# Patient Record
(demographics unavailable — no encounter records)

---

## 2024-11-19 NOTE — REVIEW OF SYSTEMS
[Abdominal Pain] : abdominal pain [Joint Pain] : joint pain [Negative] : Allergic/Immunologic [Vomiting] : no vomiting [Joint Stiffness] : no joint stiffness [Skin Rash] : no skin rash [Skin Wound] : no skin wound [FreeTextEntry9] : right knee discomfort less [de-identified] : oval skin lesion slight raised rt side face by ear and left side face - left sided lesion larger but lighter, smaller right sided lsion above first, no tumors, skin intact, no pruritus [de-identified] : right sided sciatica, persists

## 2024-11-19 NOTE — HISTORY OF PRESENT ILLNESS
[Disease:__________________________] : Disease: [unfilled] [de-identified] : Ms. 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 She first noted first noted a slightly raised lesion on her right cheek  several yrs ago which grew slowly until she was seen by a dermatologist, Dr. Kan Birmingham, [place], [place].  Biopsy was read at  [facility] (7/25/2022, reported 8/26/22) as a "nodular lymphoid infiltrate with unusual features."  A dense nodular lymphoid  infiltrate was seen in the reticular dermis where it formed germinal centers with a mantle zone; there was increased kappa vs lambda  light chain staining by about 10:1 and Ig gene rearrangement was (+). CT of neck showed sinusitis; CT C/A/P showed no LAD or H/S megaly; there were small pulm nodules, mildly enlarged uterus ? due to adenomyosis and a 4 cm left adnexal cyst. Pt reports benign findings from a pelvic U/S. She saw Dr. Toyin Shook of [facility] and pt did not bring her recommendations with her.  She has been prescribed aclometasone 0.05% and she has been applying this to the cheek skin lesion 2x/d for the last two days.  She has no constitutional c/o. She has had intermittent midepigastric pain and had upper endoscopy on 1/6/23 which only showed mild antral erythema. Biopsies were taken, results not available at this time.  She has no pruritus.   [de-identified] : ?IE [de-identified] : extranodal MZL, cutaneous [de-identified] : Extra inna marginal zone lymphoma- followed by derm Dr Kan Louis tel [phone]; she has had injections into  right cheek , now has another lesion anterior to first.  She also has a mild elevated lesion with pigmentation on left malar area. Also has been seeing Dr. Vandana Austin.  no new skin received RT to right malar lesion (Dr. Snowden, [facility] [place], [place]) No pruritus or skin breakdown. No constitutional c/o. no new skin lesions MRI of pelvis Uterus- 10.6 x 6.3 x 6.7 cm, EMS- 7 mm, there is a simple cyst within the left adnexal region intimately abutting the left ovary. The cyst measures 2.5 x 3 x 4.3 cm. Had bilat salpingectomy with benign findings.

## 2024-11-19 NOTE — PHYSICAL EXAM
[Fully active, able to carry on all pre-disease performance without restriction] : Status 0 - Fully active, able to carry on all pre-disease performance without restriction [Normal] : affect appropriate [de-identified] : no CVAT [de-identified] : minimally elevated, smooth, mildly hyperpigment oval macule, about > 1 cm  right malar prominence Other variably shaped and sized mildly hyperpigmented macules are present throughout torso. Smaller lesions above first on right, and on malar prominence on left

## 2024-11-19 NOTE — CONSULT LETTER
[DrNirmal  ___] : Dr. INGRAM [FreeTextEntry3] : Slick Calle M.D., [facility] [DrNirmal ___] : Dr. INGRAM

## 2024-11-19 NOTE — PHYSICAL EXAM
[Fully active, able to carry on all pre-disease performance without restriction] : Status 0 - Fully active, able to carry on all pre-disease performance without restriction [Normal] : affect appropriate [de-identified] : no CVAT [de-identified] : minimally elevated, smooth, mildly hyperpigment oval macule, about > 1 cm  right malar prominence Other variably shaped and sized mildly hyperpigmented macules are present throughout torso. Smaller lesions above first on right, and on malar prominence on left

## 2024-11-19 NOTE — ASSESSMENT
[Palliative] : Goals of care discussed with patient: Palliative [FreeTextEntry1] : 48 year old woman with mostly cheek lesions who has had several skin bxs done with consensus that she has findings c/w MZL. No evidence of systemic dz. Skin MZL is a very indolent neoplasm; in the 2022 ICC, it was recommended that lesions such as this be called lymphoproliferative diseases rather than lymphoma.   Pt has been seen by Dr. Kan Louis and Dr. Vandana Austin  rec continued f/u by derm  She is (+) for Hep B core antibody, has had nl LFTs, AFP and Hep B DNA PCR, and a normal fibroscan 1/31/23 (Dr. Coni Spann). she is Hep Bs Ag (-) and Ab(+) so at lower risk for reactivation  check CBC, CMP, LDH, Beta-2 MG   as long as continues regular derm f/u, RV 1 yr or prn

## 2024-11-19 NOTE — REVIEW OF SYSTEMS
[Abdominal Pain] : abdominal pain [Joint Pain] : joint pain [Negative] : Allergic/Immunologic [Vomiting] : no vomiting [Joint Stiffness] : no joint stiffness [Skin Rash] : no skin rash [Skin Wound] : no skin wound [FreeTextEntry9] : right knee discomfort less [de-identified] : oval skin lesion slight raised rt side face by ear and left side face - left sided lesion larger but lighter, smaller right sided lsion above first, no tumors, skin intact, no pruritus [de-identified] : right sided sciatica, persists

## 2024-11-19 NOTE — REASON FOR VISIT
[Follow-Up Visit] : a follow-up visit for [Lymphoma] : lymphoma [Family Member] : family member [FreeTextEntry2] : cutaneous marginal zone lymphoma

## 2024-11-19 NOTE — CONSULT LETTER
[DrNirmal  ___] : Dr. INGRAM [FreeTextEntry3] : Slick Calle M.D., [facility] [DrNirmal ___] : Dr. INGRAM

## 2024-11-19 NOTE — HISTORY OF PRESENT ILLNESS
[Disease:__________________________] : Disease: [unfilled] [de-identified] : Ms. 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 She first noted first noted a slightly raised lesion on her right cheek  several yrs ago which grew slowly until she was seen by a dermatologist, Dr. Kan Birmingham, [place], [place].  Biopsy was read at  [facility] (7/25/2022, reported 8/26/22) as a "nodular lymphoid infiltrate with unusual features."  A dense nodular lymphoid  infiltrate was seen in the reticular dermis where it formed germinal centers with a mantle zone; there was increased kappa vs lambda  light chain staining by about 10:1 and Ig gene rearrangement was (+). CT of neck showed sinusitis; CT C/A/P showed no LAD or H/S megaly; there were small pulm nodules, mildly enlarged uterus ? due to adenomyosis and a 4 cm left adnexal cyst. Pt reports benign findings from a pelvic U/S. She saw Dr. Toyin Shook of [facility] and pt did not bring her recommendations with her.  She has been prescribed aclometasone 0.05% and she has been applying this to the cheek skin lesion 2x/d for the last two days.  She has no constitutional c/o. She has had intermittent midepigastric pain and had upper endoscopy on 1/6/23 which only showed mild antral erythema. Biopsies were taken, results not available at this time.  She has no pruritus.   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  She also has a mild elevated lesion with pigmentation on left malar area. Also has been seeing Dr. Vandana Austin.  no new skin received RT to right malar lesion (Dr. Snowden, [facility], [place]) No pruritus or skin breakdown. No constitutional c/o. no new skin lesions MRI of pelvis Uterus- 10.6 x 6.3 x 6.7 cm, EMS- 7 mm, there is a simple cyst within the left adnexal region intimately abutting the left ovary. The cyst measures 2.5 x 3 x 4.3 cm. Had bilat salpingectomy with benign findings.

## 2025-01-03 NOTE — ASSESSMENT
[FreeTextEntry1] : 1. 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 Pt has had other biopsies including back and left cheek that were inconclusive although suspect they are similar process as R cheek lesion  Pt has had systemic workup that has excluded extracutaneous disease Current tx have included topical and intralesional steroids (several rounds) without much improvement Pt now s/p radiation treatment with Dr. Snowden at [facility] Feb 2024   All areas have flattened -- continue to monitor off additional radiation  2. Nodule of R superior eyelid - differential includes cyst New diagnosis,  uncertain clinical course - US first to clarify  3. Seborrheic keratosis, extremities - benign, reassured  RTC 3 months

## 2025-01-03 NOTE — ASSESSMENT
[FreeTextEntry1] : 1. 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 Pt has had other biopsies including back and left cheek that were inconclusive although suspect they are similar process as R cheek lesion  Pt has had systemic workup that has excluded extracutaneous disease Current tx have included topical and intralesional steroids (several rounds) without much improvement Pt now s/p radiation treatment with Dr. Snowden at [facility] [place] [place] Feb 2024   All areas have flattened -- continue to monitor off additional radiation  2. Nodule of R superior eyelid - differential includes cyst New diagnosis,  uncertain clinical course - US first to clarify  3. Seborrheic keratosis, extremities - benign, reassured  RTC 3 months

## 2025-01-03 NOTE — HISTORY OF PRESENT ILLNESS
[FreeTextEntry1] : RPV: marginal zone lymphoma  [de-identified] : MOLLY CORRAL is a 49 year old female who presents for fu of:  in interim: does not feel lesions have grown new bump on R superior eyelid and leg/arm she is curious about bump on R eyelid she noticed when she was rubbing her eye around 1 month ago, is not painful, her eyelid got swollen that day and she was rubbing at it so she noticed it otherwise the bump is asymptomatic and she doesn't feel it, does not change in size or drain anything  initial history 1) spots on R cheek 4-5 years, growing but asymptomatic. Outside bx with CMZL. Also had lesion biopsied on her back however this showed a "lymphoplasmacytic infiltrate with kappa predominance." Also had lesions on the L cheek, 1 improved with ILK 5. Also using alclometasone 0.05% ointment to AA. Reports she had a PET scan and colonoscopy at time of diagnosis Denies GI symptoms  - Since LV, has had one treatment radiation treatment with Dr. Snowden, feels it may have somewhat flattened but no significant changes

## 2025-01-03 NOTE — PHYSICAL EXAM
[FreeTextEntry3] : R cheek with hyperpigmented thin plaque and smaller hyperpigmented plaque superiorly  thin hyperpigmented plaques and papules on the L cheek brown ill-defined hyperpigmented macules on the back subcutaneous nodule on R superior eyelid without overlying skin change Scattered well demarcated stuck on appearing brown plaques on the extremities

## 2025-01-03 NOTE — HISTORY OF PRESENT ILLNESS
[FreeTextEntry1] : RPV: marginal zone lymphoma  [de-identified] : MOLLY CORRAL is a 49 year old female who presents for fu of:  in interim: does not feel lesions have grown new bump on R superior eyelid and leg/arm she is curious about bump on R eyelid she noticed when she was rubbing her eye around 1 month ago, is not painful, her eyelid got swollen that day and she was rubbing at it so she noticed it otherwise the bump is asymptomatic and she doesn't feel it, does not change in size or drain anything  initial history 1) spots on R cheek 4-5 years, growing but asymptomatic. Outside bx with CMZL. Also had lesion biopsied on her back however this showed a "lymphoplasmacytic infiltrate with kappa predominance." Also had lesions on the L cheek, 1 improved with ILK 5. Also using alclometasone 0.05% ointment to AA. Reports she had a PET scan and colonoscopy at time of diagnosis Denies GI symptoms  - Since LV, has had one treatment radiation treatment with Dr. Snowden, feels it may have somewhat flattened but no significant changes

## 2025-02-26 NOTE — CONSULT LETTER
[Dear  ___] : Dear  [unfilled], [Consult Letter:] : I had the pleasure of evaluating your patient, [unfilled]. [Please see my note below.] : Please see my note below. [Consult Closing:] : Thank you very much for allowing me to participate in the care of this patient.  If you have any questions, please do not hesitate to contact me. [Sincerely,] : Sincerely, [FreeTextEntry2] : Vandana Austin MD  [FreeTextEntry3] : Vandana Mott MD Facial Plastic & Reconstructive Surgery Department of Otolaryngology [address] [place] [address] [phone]

## 2025-02-26 NOTE — PHYSICAL EXAM
[Midline] : trachea located in midline position [de-identified] : right eyelid subcutaneous nodule - at eyelid cream less than 1 cm  mobile and palpable  non tender no skin changes [Normal] : no rashes

## 2025-02-26 NOTE — HISTORY OF PRESENT ILLNESS
[de-identified] : 49 year old female presents with concerns for right eye lesion.  Referred by Dr. Vandana Austin States right eye lesion has been present for 4 months with mild redness to site.  Also has concerns for eye lid drooping  Denies pain, drainage, bleeding, fevers, swelling and s/s infection.   US Head and Neck 01/09/25  IMPRESSION: At area of palpable concern in the right superior eyelid, there is a small hypoechoic solid nodule with internal vascularity which measures 0.6 x 0.3 x 0.6 cm.  SHe has a history of MZ lymphoma on the right cheek. Reports nodule in eyelid has grown in size.

## 2025-02-26 NOTE — REASON FOR VISIT
[Initial Evaluation] : an initial evaluation for [Language Line ] : provided by [employer]   [FreeTextEntry2] : right eye lesion.  [Interpreters_IDNumber] : [id number] [Interpreters_FullName] : Ty [FreeTextEntry3] : Mandarin

## 2025-02-26 NOTE — ASSESSMENT
[FreeTextEntry1] : 49 year old female with right upper eyelid lesion growing in size. We discussed in office excision with upper eyelid crease incision. We discussed RBA and postop care. Will call to schedule.

## 2025-05-06 NOTE — ASSESSMENT
[FreeTextEntry1] : 1. 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 Pt has had other biopsies including back and left cheek that were inconclusive although suspect they are similar process as R cheek lesion  Pt has had systemic workup that has excluded extracutaneous disease Current tx have included topical and intralesional steroids (several rounds) without much improvement Pt now s/p radiation treatment with Dr. Snowden at [facility] Feb 2024  All areas have flattened -- continue to monitor off additional radiation  2. Nodule of R superior eyelid - differential includes cyst US showed nodule with vascularity. Pt met with Dr. Mott to discuss biopsy  however, now flattened plan to monitor if regrows biopsy  rtc 6 months